# Patient Record
Sex: FEMALE | Race: OTHER | HISPANIC OR LATINO | URBAN - METROPOLITAN AREA
[De-identification: names, ages, dates, MRNs, and addresses within clinical notes are randomized per-mention and may not be internally consistent; named-entity substitution may affect disease eponyms.]

---

## 2017-07-02 ENCOUNTER — EMERGENCY (EMERGENCY)
Facility: HOSPITAL | Age: 57
LOS: 1 days | Discharge: HOME | End: 2017-07-02
Attending: EMERGENCY MEDICINE
Payer: MEDICAID

## 2017-07-02 VITALS
HEART RATE: 70 BPM | RESPIRATION RATE: 20 BRPM | OXYGEN SATURATION: 98 % | TEMPERATURE: 98 F | WEIGHT: 179.9 LBS | SYSTOLIC BLOOD PRESSURE: 168 MMHG | DIASTOLIC BLOOD PRESSURE: 95 MMHG

## 2017-07-02 VITALS
DIASTOLIC BLOOD PRESSURE: 85 MMHG | HEART RATE: 64 BPM | RESPIRATION RATE: 12 BRPM | OXYGEN SATURATION: 100 % | SYSTOLIC BLOOD PRESSURE: 142 MMHG

## 2017-07-02 PROCEDURE — 93010 ELECTROCARDIOGRAM REPORT: CPT

## 2017-07-02 PROCEDURE — 99284 EMERGENCY DEPT VISIT MOD MDM: CPT

## 2017-07-02 PROCEDURE — 99283 EMERGENCY DEPT VISIT LOW MDM: CPT | Mod: 25

## 2017-07-02 PROCEDURE — 93005 ELECTROCARDIOGRAM TRACING: CPT

## 2017-07-02 RX ORDER — IBUPROFEN 200 MG
400 TABLET ORAL ONCE
Qty: 0 | Refills: 0 | Status: COMPLETED | OUTPATIENT
Start: 2017-07-02 | End: 2017-07-02

## 2017-07-02 RX ORDER — ACETAMINOPHEN 500 MG
975 TABLET ORAL ONCE
Qty: 0 | Refills: 0 | Status: COMPLETED | OUTPATIENT
Start: 2017-07-02 | End: 2017-07-02

## 2017-07-02 RX ORDER — ATORVASTATIN CALCIUM 80 MG/1
1 TABLET, FILM COATED ORAL
Qty: 0 | Refills: 0 | COMMUNITY

## 2017-07-02 RX ORDER — ACETAMINOPHEN 500 MG
2 TABLET ORAL
Qty: 84 | Refills: 0 | OUTPATIENT
Start: 2017-07-02 | End: 2017-07-16

## 2017-07-02 RX ORDER — IBUPROFEN 200 MG
1 TABLET ORAL
Qty: 42 | Refills: 0 | OUTPATIENT
Start: 2017-07-02 | End: 2017-07-16

## 2017-07-02 RX ADMIN — Medication 975 MILLIGRAM(S): at 13:10

## 2017-07-02 RX ADMIN — Medication 400 MILLIGRAM(S): at 13:10

## 2017-07-02 NOTE — ED PROVIDER NOTE - CARE PLAN
Principal Discharge DX:	Pectoralis muscle strain, initial encounter  Instructions for follow-up, activity and diet:	You were seen and evaluated for chest wall pain. It is likely a strain of your petoralis muscle and rotator cuff. You may take up to 400mg of ibuprofen (Motrin, Advil) three times daily needed for pain. Please take ibuprofen with food if possible to prevent stomach upset. You may also take up to 1000mg of acetaminophen (Tylenol) three times daily as needed for pain. It is ok to mix these medications with each other. Do not mix them with other pain medications such as naproxen (Alieve) or asprin unless specifically instructed by your doctor. Many prescription pain medications and cough medications contain acetaminophen. If you take these medications, please discuss with your provider or primary care doctor if you need to adjust the dose of acetaminophen you can take. You may apply ice to the affected area for no more than 15 minutes as needed for comfort. You may apply ice every 2-4 times a day as needed.     Use your sling as needed for pain. Do not leave your arm in the sling at all times. Do the shoulder exercises as instructed.     It is important to understand that while your workup was reassuring, no medical workup can completely exclude all concerning conditions. Therefore, we ask that you please return if you develop new or worsening pain, trouble breathing, fainting (or feel that you might faint), weakness, inability to perform your daily activities, or other concerning new symptoms (such as listed on the attached information sheets. Please read the attached information sheets. Take your medication as prescribed including your blood pressure medication.

## 2017-07-02 NOTE — ED PROVIDER NOTE - PLAN OF CARE
You were seen and evaluated for chest wall pain. It is likely a strain of your petoralis muscle and rotator cuff. You may take up to 400mg of ibuprofen (Motrin, Advil) three times daily needed for pain. Please take ibuprofen with food if possible to prevent stomach upset. You may also take up to 1000mg of acetaminophen (Tylenol) three times daily as needed for pain. It is ok to mix these medications with each other. Do not mix them with other pain medications such as naproxen (Alieve) or asprin unless specifically instructed by your doctor. Many prescription pain medications and cough medications contain acetaminophen. If you take these medications, please discuss with your provider or primary care doctor if you need to adjust the dose of acetaminophen you can take. You may apply ice to the affected area for no more than 15 minutes as needed for comfort. You may apply ice every 2-4 times a day as needed.     Use your sling as needed for pain. Do not leave your arm in the sling at all times. Do the shoulder exercises as instructed.     It is important to understand that while your workup was reassuring, no medical workup can completely exclude all concerning conditions. Therefore, we ask that you please return if you develop new or worsening pain, trouble breathing, fainting (or feel that you might faint), weakness, inability to perform your daily activities, or other concerning new symptoms (such as listed on the attached information sheets. Please read the attached information sheets. Take your medication as prescribed including your blood pressure medication.

## 2017-07-02 NOTE — ED PROVIDER NOTE - MEDICAL DECISION MAKING DETAILS
Pt with clear onset of pain with internal rotationa nd TTP over pectoralis muscle. Multiple positive findings for rotator cuff injury reproducing pain of complaint. Immediate improvement with ice. Will repeat BP after pain control. EKG is reassuring and baseline. While some cardiac pain is reproducible, I do not think this is cardiac as all aspects of exam and history indicate musculoskelital pain. I have no clinical concern for cardiac etiology or other concerning etiologies. Pt will be discharged on tylenol and NSAIDs and referred to f/u with sports medicine. She was instructed on medications and exercises for pain management. I answered all questions to the best of my ability. I have advised the patient on the usual course of this illness, an appropriate schedule for follow-up, and concerning signs and symptoms that should prompt return to the emergency department. The patient is stable for discharge.

## 2017-07-02 NOTE — ED PROVIDER NOTE - PHYSICAL EXAMINATION
GEN: well appearing, nontoxic, uncomfortable in mild-moderat distress from pain  HEAD: NCAT  EYES: clear, pupils equal and round  ENT: mucus membranes are moist, neck is supple, no JVD  CV: normal rate, regular rythm  CHEST: lungs clear to auscultation bilaterally, equal breath sounds bilaterally, no aventitious breath sounds. L-upper pectoral region is exquisitely point tender and reproduces the pain of complaint. no overlying rash or skin changes  ABD: soft, nontender  : no CVAT  MSK: no extremity swelling or edema. chest pain of complaint elicited by active shoulder elevation and abduction but is minimal with passive ROM. + drop test on left, +empty beer can test also eliciting pain of complaint. pain elicited with abduction. no point tenderness on shoulder joint. 2+ radial pulses symmetric bilatearlly  NEURO: 5/5 strength throughout, Sensation intact to light touch throughout trunk and extremities

## 2017-07-02 NOTE — ED ADULT NURSE NOTE - OBJECTIVE STATEMENT
patient c/o chest pain s/p undoing her bra patient c/o chest pain s/p undoing her bra and started experiencing left sided chest pain, increase in pain with movement.

## 2017-07-02 NOTE — ED PROVIDER NOTE - NS ED ROS FT
CONST: no fevers, no chills, no trauma  EYES: no pain, no visual disturbances  ENT: no sore throat, no epistaxis, no rhinorhea, no hearing changes  CV: see HPI, no palpitations, no orthopnea, no extremity pain or swelling  RESP: no shortness of breath, no cough, no sputum, no pleurisy, no wheezing  ABD: no abdominal pain, no nausea, no vomiting, no diarrhea, no black or bloody stool  : no dysuria, no hematuria, no frequency, no urgency  MSK: see HPI, no neck pain, no back pain, no extremity pain or swelling  NEURO: no headache, no sensory disturbances, no focal weakness, no dizziness  HEME: no easy bleeding or bruising  SKIN: no diaphoresis, no rash

## 2017-07-02 NOTE — ED PROVIDER NOTE - OBJECTIVE STATEMENT
This is a 56 year old female w/Hx of HTN who presents for 3 days of constant left pectoral pain. The pain was sudden in onset when she was reaching behind her back to take off her bra. It has been constant, aching, improved mildly with aspirin but othewise persistent. It is worse when her chest wall is touched and with certain movements of her L-arm. It does not radiate. She denies dyspnea, SNIDER, cough, hemoptysis, extremity pain or swelling or pleurisy. The pain is nonpleuritic, nonexertional, and unrelated to exercise unless it involves moving her left arm. She has had similar but milder episodes in the past. She has not taken anything for pain except the aspirin right when it happened.

## 2017-07-02 NOTE — ED PROVIDER NOTE - PROGRESS NOTE DETAILS
clear musculoskeletal pain BP improved with pain. no concern for ACS, rady for DC. advised on exercises and pain medications, reasons to return to ED.  used.

## 2018-08-01 ENCOUNTER — EMERGENCY (EMERGENCY)
Facility: HOSPITAL | Age: 58
LOS: 1 days | Discharge: HOME | End: 2018-08-01
Attending: EMERGENCY MEDICINE
Payer: MEDICAID

## 2018-08-01 VITALS
RESPIRATION RATE: 20 BRPM | HEART RATE: 72 BPM | SYSTOLIC BLOOD PRESSURE: 139 MMHG | OXYGEN SATURATION: 98 % | TEMPERATURE: 100 F | DIASTOLIC BLOOD PRESSURE: 85 MMHG

## 2018-08-01 VITALS — WEIGHT: 149.91 LBS

## 2018-08-01 PROBLEM — I10 ESSENTIAL (PRIMARY) HYPERTENSION: Chronic | Status: ACTIVE | Noted: 2017-07-02

## 2018-08-01 PROBLEM — E78.5 HYPERLIPIDEMIA, UNSPECIFIED: Chronic | Status: ACTIVE | Noted: 2017-07-02

## 2018-08-01 LAB
APPEARANCE UR: CLEAR — SIGNIFICANT CHANGE UP
BILIRUB UR-MCNC: NEGATIVE — SIGNIFICANT CHANGE UP
COLOR SPEC: YELLOW — SIGNIFICANT CHANGE UP
DIFF PNL FLD: NEGATIVE — SIGNIFICANT CHANGE UP
GLUCOSE UR QL: NEGATIVE MG/DL — SIGNIFICANT CHANGE UP
KETONES UR-MCNC: ABNORMAL
LEUKOCYTE ESTERASE UR-ACNC: NEGATIVE — SIGNIFICANT CHANGE UP
NITRITE UR-MCNC: NEGATIVE — SIGNIFICANT CHANGE UP
PH UR: 5 — SIGNIFICANT CHANGE UP (ref 5–8)
PROT UR-MCNC: NEGATIVE MG/DL — SIGNIFICANT CHANGE UP
SP GR SPEC: 1.01 — SIGNIFICANT CHANGE UP (ref 1.01–1.02)
UROBILINOGEN FLD QL: NEGATIVE MG/DL — SIGNIFICANT CHANGE UP

## 2018-08-01 PROCEDURE — 87086 URINE CULTURE/COLONY COUNT: CPT

## 2018-08-01 PROCEDURE — 81001 URINALYSIS AUTO W/SCOPE: CPT

## 2018-08-01 PROCEDURE — 99283 EMERGENCY DEPT VISIT LOW MDM: CPT

## 2018-08-01 NOTE — ED ADULT NURSE NOTE - NSIMPLEMENTINTERV_GEN_ALL_ED
Implemented All Universal Safety Interventions:  [place] to call system. Call bell, personal items and telephone within reach. Instruct patient to call for assistance. Room bathroom lighting operational. Non-slip footwear when patient is off stretcher. Physically safe environment: no spills, clutter or unnecessary equipment. Stretcher in lowest position, wheels locked, appropriate side rails in place.

## 2018-08-01 NOTE — ED ADULT NURSE NOTE - OBJECTIVE STATEMENT
Pt c/o pain to the lower abdomen/vaginal area that started Monday night into Tuesday morning. Pt states pressure worsened and states she 'feels like something dropped and she feels like she is having more difficulty urinating and has been taking Advil/ Ibuprofen 600mg Advil and 1 400mg Ibuprofen with some relief.  Pt denies fever/chills.  PT A & Ox4, respirations are even & unlabored.

## 2018-08-01 NOTE — ED STATDOCS - OBJECTIVE STATEMENT
C/C "IT FEELS LIKE SOMETHING DROPPED IN MY VAGINA"  58 YO FEMALE WITH HX G5 PRESENTS WITH ABOVE CC SINCE LAST NIGHT  NO TRAUMA  NO HEAVY LIFTING  ADMITS TO TROUBLE WITH URINATION - RELEASE NOT PAIN

## 2018-08-01 NOTE — ED ADULT TRIAGE NOTE - CHIEF COMPLAINT QUOTE
Patient states that she has been having vaginal pain since tuesday. Patient states "I felt like something dropped inside me" Patient states that she is having pressure in her vaginal area. Denies any discharge

## 2018-08-01 NOTE — ED STATDOCS - CHPI ED SYMPTOM NEG
no chills/no hematuria/no dysuria/no pain/no fever/no nausea/no palpitations/no numbness/no decreased eating/drinking/no vomiting/no weakness

## 2018-08-02 LAB
CULTURE RESULTS: NO GROWTH — SIGNIFICANT CHANGE UP
SPECIMEN SOURCE: SIGNIFICANT CHANGE UP

## 2021-06-29 NOTE — ED ADULT NURSE NOTE - DISCHARGE DATE/TIME
01-Aug-2018 18:36

## 2024-05-14 NOTE — ED ADULT NURSE NOTE - CHIEF COMPLAINT QUOTE
What Type Of Note Output Would You Prefer (Optional)?: Bullet Format
What Is The Reason For Today's Visit?: Full Body Skin Examination with No Concerns
chest pain, started Friday.
What Is The Reason For Today's Visit? (Being Monitored For X): concerning skin lesions on a periodic basis